# Patient Record
Sex: MALE | Race: OTHER | NOT HISPANIC OR LATINO
[De-identification: names, ages, dates, MRNs, and addresses within clinical notes are randomized per-mention and may not be internally consistent; named-entity substitution may affect disease eponyms.]

---

## 2021-01-08 ENCOUNTER — TRANSCRIPTION ENCOUNTER (OUTPATIENT)
Age: 51
End: 2021-01-08

## 2021-02-13 ENCOUNTER — TRANSCRIPTION ENCOUNTER (OUTPATIENT)
Age: 51
End: 2021-02-13

## 2024-06-14 ENCOUNTER — APPOINTMENT (OUTPATIENT)
Dept: ORTHOPEDIC SURGERY | Facility: CLINIC | Age: 54
End: 2024-06-14
Payer: COMMERCIAL

## 2024-06-14 DIAGNOSIS — M79.18 MYALGIA, OTHER SITE: ICD-10-CM

## 2024-06-14 DIAGNOSIS — M54.50 LOW BACK PAIN, UNSPECIFIED: ICD-10-CM

## 2024-06-14 PROBLEM — Z00.00 ENCOUNTER FOR PREVENTIVE HEALTH EXAMINATION: Status: ACTIVE | Noted: 2024-06-14

## 2024-06-14 PROCEDURE — 72110 X-RAY EXAM L-2 SPINE 4/>VWS: CPT

## 2024-06-14 PROCEDURE — 99203 OFFICE O/P NEW LOW 30 MIN: CPT

## 2024-06-14 RX ORDER — MELOXICAM 15 MG/1
15 TABLET ORAL
Qty: 30 | Refills: 0 | Status: ACTIVE | COMMUNITY
Start: 2024-06-14 | End: 1900-01-01

## 2024-06-14 RX ORDER — TIZANIDINE 4 MG/1
4 TABLET ORAL
Qty: 30 | Refills: 0 | Status: ACTIVE | COMMUNITY
Start: 2024-06-14 | End: 1900-01-01

## 2024-06-14 NOTE — IMAGING
[de-identified] : lumbar spine: - SLR bilaterally, + tenderness to palpation over b/l paraspinals, 5/5 strength in the lower extremities, 2/2 reflexes, he ambulates without assistance, pain with flexion.  X-Ray lumbar spine;  degenerative changes

## 2024-06-14 NOTE — HISTORY OF PRESENT ILLNESS
[de-identified] : Mr. Cota is a 54 year old male who presents for evaluation for lumbar pain for the past 6 months. He states his pain has been worsening over the past few days and is unsure what caused it. He denies any injury but states he is a  for the [employer] and sits for extended periods of time.    He denies any radiation into the lower extremities or parasthesias. He states the pain is located on both sides of his lumbar spine and buttocks.  He has been using Ibuprofen 600 mg with minimal improvement.

## 2024-06-14 NOTE — ASSESSMENT
[FreeTextEntry1] : 54 year old male with lumbar pain.  I will provide him a prescription to attend PT 2-3 times per week with a HEP.  I have prescribed Mobic 15mg qd prn and Zanaflex 4 mg 1/2 to 1 tab po qhs. He is aware the Zanaflex can make hiim drowsy and he will only take it at night or days he does not work.  He will f/u in 6 weeks for reevaluation and if no improvement we will discuss obtaining an MRI for further assessment.  He is aware if there are any issues he can contact the office and he verbalizes his understanding and agreement.

## 2024-07-26 ENCOUNTER — APPOINTMENT (OUTPATIENT)
Dept: ORTHOPEDIC SURGERY | Facility: CLINIC | Age: 54
End: 2024-07-26